# Patient Record
Sex: MALE | Race: WHITE | HISPANIC OR LATINO | Employment: FULL TIME | URBAN - METROPOLITAN AREA
[De-identification: names, ages, dates, MRNs, and addresses within clinical notes are randomized per-mention and may not be internally consistent; named-entity substitution may affect disease eponyms.]

---

## 2019-11-20 ENCOUNTER — OFFICE VISIT (OUTPATIENT)
Dept: URGENT CARE | Facility: PHYSICIAN GROUP | Age: 39
End: 2019-11-20

## 2019-11-20 VITALS
WEIGHT: 168.6 LBS | SYSTOLIC BLOOD PRESSURE: 102 MMHG | HEART RATE: 65 BPM | OXYGEN SATURATION: 96 % | TEMPERATURE: 97.8 F | HEIGHT: 62 IN | BODY MASS INDEX: 31.03 KG/M2 | RESPIRATION RATE: 16 BRPM | DIASTOLIC BLOOD PRESSURE: 70 MMHG

## 2019-11-20 DIAGNOSIS — M77.8 TENDONITIS OF ELBOW, RIGHT: ICD-10-CM

## 2019-11-20 DIAGNOSIS — J45.901 MILD ASTHMA WITH ACUTE EXACERBATION, UNSPECIFIED WHETHER PERSISTENT: ICD-10-CM

## 2019-11-20 PROCEDURE — 99203 OFFICE O/P NEW LOW 30 MIN: CPT | Performed by: PHYSICIAN ASSISTANT

## 2019-11-20 RX ORDER — MELOXICAM 7.5 MG/1
7.5 TABLET ORAL DAILY
Qty: 10 TAB | Refills: 0 | Status: SHIPPED | OUTPATIENT
Start: 2019-11-20 | End: 2019-11-30

## 2019-11-20 RX ORDER — ALBUTEROL SULFATE 90 UG/1
2 AEROSOL, METERED RESPIRATORY (INHALATION) EVERY 6 HOURS PRN
Qty: 8.5 G | Refills: 0 | Status: SHIPPED | OUTPATIENT
Start: 2019-11-20 | End: 2022-07-08

## 2019-11-20 NOTE — PROGRESS NOTES
"Subjective:      Kian Jackson is a 39 y.o. male who presents with Hand Pain (he states that right elbow hurts him, hes unable to lift anything, x2 weeks)            Pt is a 38 y/o male who presents to  with right elbow pain for the last 2 weeks. He denies specific injury- however reports lifting heavy objects along with pushing and turning his wrist all aggravate his right elbow- and gripping. He denies specific fall or trauma. He is right handed.   On exam he also with with slight wheeze he does mention that he has hx of asthma and is requesting an inhaler today as well. Denies any numbness or tingling.     He is with his wife today who also provides hx-  was used throughout the duration of the visit today.       Other   This is a new problem. Episode onset: 2 weeks ago. The problem occurs daily. The problem has been waxing and waning. Associated symptoms include coughing. Pertinent negatives include no chills, congestion, fever, joint swelling or rash. Exacerbated by: Lifting, twisting his wrist against resistance.  He has tried nothing for the symptoms.       Review of Systems   Constitutional: Negative for chills and fever.   HENT: Negative for congestion.    Eyes: Negative for discharge and redness.   Respiratory: Positive for cough and wheezing.    Musculoskeletal: Positive for joint pain. Negative for back pain, falls and joint swelling.   Skin: Negative for rash.   Neurological: Positive for focal weakness. Negative for tingling and sensory change.        Right  weakness.    All other systems reviewed and are negative.         Objective:     /70 (BP Location: Left arm, Patient Position: Sitting, BP Cuff Size: Adult)   Pulse 65   Temp 36.6 °C (97.8 °F) (Temporal)   Resp 16   Ht 1.575 m (5' 2\")   Wt 76.5 kg (168 lb 9.6 oz)   SpO2 96%   BMI 30.84 kg/m²    PMH:  has no past medical history on file.  MEDS: Reviewed .   ALLERGIES: No Known Allergies  SURGHX: No past surgical history on " file.  SOCHX:    FH: Family history was reviewed, no pertinent findings to report    Physical Exam  Vitals signs reviewed.   Constitutional:       General: He is not in acute distress.     Appearance: He is well-developed.   HENT:      Head: Normocephalic and atraumatic.      Right Ear: External ear normal.      Left Ear: External ear normal.      Mouth/Throat:      Pharynx: No oropharyngeal exudate.   Eyes:      Conjunctiva/sclera: Conjunctivae normal.      Pupils: Pupils are equal, round, and reactive to light.   Neck:      Musculoskeletal: Normal range of motion and neck supple.      Trachea: No tracheal deviation.   Cardiovascular:      Rate and Rhythm: Normal rate and regular rhythm.      Heart sounds: No murmur.   Pulmonary:      Effort: Pulmonary effort is normal. No respiratory distress.      Breath sounds: Wheezing present.   Musculoskeletal:      Right elbow: Tenderness found. Lateral epicondyle tenderness noted.      Comments: Slight tenderness to the right lat. Epicondyle with , and resisted pronation.   Resolved pain with counterforce pressure to the lateral elbow. Without bony tenderness. FROM of the elbow.  equal.    Skin:     General: Skin is warm.      Findings: No rash.   Neurological:      Mental Status: He is alert and oriented to person, place, and time.      Coordination: Coordination normal.   Psychiatric:         Behavior: Behavior normal.         Thought Content: Thought content normal.         Judgment: Judgment normal.                 Assessment/Plan:       1. Tendonitis of elbow, right  - meloxicam (MOBIC) 7.5 MG Tab; Take 1 Tab by mouth every day for 10 days.  Dispense: 10 Tab; Refill: 0    2. Mild asthma with acute exacerbation, unspecified whether persistent  - albuterol 108 (90 Base) MCG/ACT Aero Soln inhalation aerosol; Inhale 2 Puffs by mouth every 6 hours as needed for Shortness of Breath.  Dispense: 8.5 g; Refill: 0    Will write for the above- avoid other NSAIDs,    Discussed counterforce bracing as well.   Ice. F/U with PCP for further asthma care and recheck of tendonitis.   Patient given precautionary s/sx that mandate immediate follow up and evaluation in the ED. Advised of risks of not doing so.    DDX, Supportive care, and indications for immediate follow-up discussed with patient.    Instructed to return to clinic or nearest emergency department if we are not available for any change in condition, further concerns, or worsening of symptoms.    The patient demonstrated a good understanding and agreed with the treatment plan.  Please note that this dictation was created using voice recognition software. I have made every reasonable attempt to correct obvious errors, but I expect that there are errors of grammar and possibly content that I did not discover before finalizing the note.

## 2019-11-21 ASSESSMENT — ENCOUNTER SYMPTOMS
TINGLING: 0
FALLS: 0
COUGH: 1
FEVER: 0
JOINT SWELLING: 0
BACK PAIN: 0
SENSORY CHANGE: 0
FOCAL WEAKNESS: 1
EYE REDNESS: 0
EYE DISCHARGE: 0
CHILLS: 0
WHEEZING: 1

## 2020-06-23 ENCOUNTER — OFFICE VISIT (OUTPATIENT)
Dept: URGENT CARE | Facility: PHYSICIAN GROUP | Age: 40
End: 2020-06-23

## 2020-06-23 ENCOUNTER — HOSPITAL ENCOUNTER (OUTPATIENT)
Facility: MEDICAL CENTER | Age: 40
End: 2020-06-23
Attending: NURSE PRACTITIONER
Payer: OTHER GOVERNMENT

## 2020-06-23 VITALS
BODY MASS INDEX: 30.91 KG/M2 | OXYGEN SATURATION: 92 % | HEART RATE: 106 BPM | TEMPERATURE: 99.8 F | RESPIRATION RATE: 16 BRPM | HEIGHT: 62 IN | SYSTOLIC BLOOD PRESSURE: 106 MMHG | WEIGHT: 168 LBS | DIASTOLIC BLOOD PRESSURE: 68 MMHG

## 2020-06-23 DIAGNOSIS — Z20.822 SUSPECTED COVID-19 VIRUS INFECTION: ICD-10-CM

## 2020-06-23 LAB — COVID ORDER STATUS COVID19: NORMAL

## 2020-06-23 PROCEDURE — 99214 OFFICE O/P EST MOD 30 MIN: CPT | Performed by: NURSE PRACTITIONER

## 2020-06-23 PROCEDURE — U0003 INFECTIOUS AGENT DETECTION BY NUCLEIC ACID (DNA OR RNA); SEVERE ACUTE RESPIRATORY SYNDROME CORONAVIRUS 2 (SARS-COV-2) (CORONAVIRUS DISEASE [COVID-19]), AMPLIFIED PROBE TECHNIQUE, MAKING USE OF HIGH THROUGHPUT TECHNOLOGIES AS DESCRIBED BY CMS-2020-01-R: HCPCS

## 2020-06-23 RX ORDER — DEXTROMETHORPHAN HYDROBROMIDE AND PROMETHAZINE HYDROCHLORIDE 15; 6.25 MG/5ML; MG/5ML
5 SYRUP ORAL EVERY 4 HOURS PRN
Qty: 100 ML | Refills: 0 | Status: SHIPPED | OUTPATIENT
Start: 2020-06-23 | End: 2022-07-08

## 2020-06-23 RX ORDER — BENZONATATE 100 MG/1
100 CAPSULE ORAL 3 TIMES DAILY PRN
Qty: 60 CAP | Refills: 0 | Status: SHIPPED | OUTPATIENT
Start: 2020-06-23 | End: 2022-07-08

## 2020-06-23 ASSESSMENT — ENCOUNTER SYMPTOMS
VOMITING: 0
WHEEZING: 1
ABDOMINAL PAIN: 0
NAUSEA: 0
HEADACHES: 0
FEVER: 1
DIARRHEA: 0
DIZZINESS: 0
CHILLS: 1
SPUTUM PRODUCTION: 0
COUGH: 1
SORE THROAT: 0
SHORTNESS OF BREATH: 1

## 2020-06-23 NOTE — PROGRESS NOTES
Subjective:   Kian Jackson  is a 39 y.o. male who presents for Cough (Cough, fever ongoing 4 days)        Cough   This is a new problem. Episode onset: 5 days ago. The problem has been gradually worsening. The cough is non-productive. Associated symptoms include chills, a fever, shortness of breath and wheezing. Pertinent negatives include no headaches, rash or sore throat. Associated symptoms comments: 39-year-old male patient reports urgent care for new problem that started proximately 5 days ago.  Patient states he is developed a dry cough, shortness of breath and T-max fever 100.8.  Is currently not taking anything over-the-counter for symptoms but does have a history of asthma and has been using his albuterol inhaler daily.  Did have some nausea and vomiting 2 days ago but that has since disappeared.  Patient denies any nausea, vomiting today denies any rash, headache, diarrhea.. Exacerbated by: activity. He has tried a beta-agonist inhaler for the symptoms. The treatment provided mild relief. His past medical history is significant for asthma.     Review of Systems   Constitutional: Positive for chills, fever and malaise/fatigue.   HENT: Negative for sore throat.    Respiratory: Positive for cough, shortness of breath and wheezing. Negative for sputum production.    Gastrointestinal: Negative for abdominal pain, diarrhea, nausea and vomiting.   Skin: Negative for itching and rash.   Neurological: Negative for dizziness and headaches.     History reviewed. No pertinent past medical history. History reviewed. No pertinent surgical history.   Social History     Socioeconomic History   • Marital status:      Spouse name: Not on file   • Number of children: Not on file   • Years of education: Not on file   • Highest education level: Not on file   Occupational History   • Not on file   Social Needs   • Financial resource strain: Not on file   • Food insecurity     Worry: Not on file     Inability: Not on file  "  • Transportation needs     Medical: Not on file     Non-medical: Not on file   Tobacco Use   • Smoking status: Never Smoker   • Smokeless tobacco: Never Used   Substance and Sexual Activity   • Alcohol use: Not on file   • Drug use: Not on file   • Sexual activity: Not on file   Lifestyle   • Physical activity     Days per week: Not on file     Minutes per session: Not on file   • Stress: Not on file   Relationships   • Social connections     Talks on phone: Not on file     Gets together: Not on file     Attends [employer] service: Not on file     Active member of club or organization: Not on file     Attends meetings of clubs or organizations: Not on file     Relationship status: Not on file   • Intimate partner violence     Fear of current or ex partner: Not on file     Emotionally abused: Not on file     Physically abused: Not on file     Forced sexual activity: Not on file   Other Topics Concern   • Not on file   Social History Narrative   • Not on file    Patient has no known allergies.       Objective:   /68   Pulse (!) 106   Temp 37.7 °C (99.8 °F) (Temporal)   Resp 16   Ht 1.575 m (5' 2\")   Wt 76.2 kg (168 lb)   SpO2 92%   BMI 30.73 kg/m²   Physical Exam  Vitals signs reviewed.   Constitutional:       Appearance: He is ill-appearing.   HENT:      Right Ear: Tympanic membrane, ear canal and external ear normal.      Left Ear: Tympanic membrane, ear canal and external ear normal.      Nose: Nose normal.      Mouth/Throat:      Mouth: Mucous membranes are moist.   Cardiovascular:      Rate and Rhythm: Normal rate and regular rhythm.      Heart sounds: Normal heart sounds.   Pulmonary:      Effort: Pulmonary effort is normal.      Breath sounds: Normal breath sounds.   Skin:     General: Skin is warm.   Neurological:      Mental Status: He is alert and oriented to person, place, and time.   Psychiatric:         Mood and Affect: Mood normal.         Behavior: Behavior normal.         Thought Content: " Thought content normal.         Judgment: Judgment normal.           Assessment/Plan:   1. Suspected COVID-19 virus infection  - COVID/SARS COV-2 PCR; Future  - benzonatate (TESSALON) 100 MG Cap; Take 1 Cap by mouth 3 times a day as needed for Cough.  Dispense: 60 Cap; Refill: 0  - promethazine-dextromethorphan (PROMETHAZINE-DM) 6.25-15 MG/5ML syrup; Take 5 mL by mouth every four hours as needed for Cough.  Dispense: 100 mL; Refill: 0    Discussed physical examination findings as well as patient complaints could be due to viral etiology including but not limited to COVID-19.  Will perform eye cover test and call with results.  Advised patient to stay at home for 14 days or until he hears from me with negative results.  Discussed symptomatic care including increase fluids using electrolyte enriched beverages, over-the-counter Tylenol, continuation of albuterol inhaler as needed as well as Tessalon Perles and promethazine at night for cough.  Did discuss sedating effects of promethazine    Supportive care, differential diagnoses, and indications for immediate follow-up discussed with patient.    Pathogenesis of diagnosis discussed including typical length and natural progression. Patient expresses understanding and agrees to plan.    Appropriate PPE worn at all times by provider. Patient had face mask on for entirety of visit other than during oropharyngeal examination  Work note provided       Please note that this dictation was created using voice recognition software. I have made every reasonable attempt to correct obvious errors, but I expect that there are errors of grammar and possibly content that I did not discover before finalizing the note.

## 2020-06-23 NOTE — LETTER
June 23, 2020         Patient: Kian Jackson   YOB: 1980   Date of Visit: 6/23/2020           To Whom it May Concern:    Kian Jackson was seen in my clinic on 6/23/2020. We are following CDC recommendations and patient is not to return to work for 14 days and self isolate or is symptom free for 72 hours without the use of over the counter medications.   If you have any questions or concerns, please don't hesitate to call.        Sincerely,           PORFIRIO Rubalcava.  Electronically Signed

## 2020-06-24 ENCOUNTER — TELEPHONE (OUTPATIENT)
Dept: URGENT CARE | Facility: PHYSICIAN GROUP | Age: 40
End: 2020-06-24

## 2020-06-24 LAB
SARS-COV-2 RNA RESP QL NAA+PROBE: DETECTED
SPECIMEN SOURCE: ABNORMAL

## 2020-06-24 NOTE — TELEPHONE ENCOUNTER
Called and spoke with patient's wife due to him being [place]ian-speaking only.  Discussed that he was positive for the COVID 19 virus.  Advised to stay home for 14 days.  Wife asked if she should be tested and since she has been exposed she can come in to either the urgent care or go to the county and get tested due to positive exposure.  Gave strict ER precautions for worsening symptoms.       Pathogenesis of diagnosis discussed including typical length and natural progression. Patient expresses understanding and agrees to plan.         Please note that this dictation was created using voice recognition software. I have made every reasonable attempt to correct obvious errors, but I expect that there are errors of grammar and possibly content that I did not discover before finalizing the note.

## 2022-07-08 ENCOUNTER — OFFICE VISIT (OUTPATIENT)
Dept: URGENT CARE | Facility: PHYSICIAN GROUP | Age: 42
End: 2022-07-08

## 2022-07-08 VITALS
TEMPERATURE: 97.1 F | HEART RATE: 73 BPM | HEIGHT: 68 IN | RESPIRATION RATE: 20 BRPM | DIASTOLIC BLOOD PRESSURE: 64 MMHG | WEIGHT: 170 LBS | OXYGEN SATURATION: 98 % | BODY MASS INDEX: 25.76 KG/M2 | SYSTOLIC BLOOD PRESSURE: 118 MMHG

## 2022-07-08 DIAGNOSIS — S51.811A LACERATION OF RIGHT FOREARM, INITIAL ENCOUNTER: ICD-10-CM

## 2022-07-08 PROCEDURE — 90714 TD VACC NO PRESV 7 YRS+ IM: CPT | Performed by: NURSE PRACTITIONER

## 2022-07-08 PROCEDURE — 90471 IMMUNIZATION ADMIN: CPT | Performed by: NURSE PRACTITIONER

## 2022-07-08 PROCEDURE — 12002 RPR S/N/AX/GEN/TRNK2.6-7.5CM: CPT | Performed by: NURSE PRACTITIONER

## 2022-07-08 NOTE — PROGRESS NOTES
Chief Complaint   Patient presents with   • Laceration     Right forearm- today        HISTORY OF PRESENT ILLNESS: Patient is a pleasant 41 y.o. male who presents to urgent care today with complaints of a laceration. Notes he accidentally lacerated his right forearm this morning with the edge of a piece of metal, at a friends house. He washed the wound out immediately, placed wound  on after the incident. He is right hand dominate. Denies numbness, tingling, weakness. He cannot recall his last tetanus booster.     There are no problems to display for this patient.      Allergies:Patient has no known allergies.    No current [facility]-ordered outpatient medications on file.     No current [facility]-ordered facility-administered medications on file.       History reviewed. No pertinent past medical history.    Social History     Tobacco Use   • Smoking status: Never Smoker   • Smokeless tobacco: Never Used   Vaping Use   • Vaping Use: Never used   Substance Use Topics   • Alcohol use: Never   • Drug use: Never       No family status information on file.   History reviewed. No pertinent family history.    ROS:  Review of Systems   Constitutional: Negative for fever, chills, weight loss, malaise, and fatigue.   HENT: Negative for ear pain, nosebleeds, congestion, sore throat and neck pain.    Eyes: Negative for vision changes.   Neuro: Negative for headache, sensory changes, weakness, seizure, LOC.   Cardiovascular: Negative for chest pain, palpitations, orthopnea and leg swelling.   Respiratory: Negative for cough, sputum production, shortness of breath and wheezing.   Gastrointestinal: Negative for abdominal pain, nausea, vomiting or diarrhea.   Genitourinary: Negative for dysuria, urgency and frequency.  Musculoskeletal: Negative for falls, neck pain, back pain, joint pain, myalgias.   Skin: Positive for laceration. Negative for rash, diaphoresis.     Exam:  /64 (BP Location: Right arm, Patient Position: Sitting,  "BP Cuff Size: Adult)   Pulse 73   Temp 36.2 °C (97.1 °F) (Temporal)   Resp 20   Ht 1.727 m (5' 8\")   Wt 77.1 kg (170 lb)   SpO2 98%   General: well-nourished, well-developed male in NAD  Head: normocephalic, atraumatic  Eyes: PERRLA, no conjunctival injection, acuity grossly intact, lids normal.  Ears: normal shape and symmetry, no tenderness, no discharge. External canals are without any significant edema or erythema. Tympanic membranes are without any inflammation, no effusion. Gross auditory acuity is intact.  Nose: symmetrical without tenderness, no discharge.  Mouth/Throat: reasonable hygiene, no erythema, exudates or tonsillar enlargement.  Neck: no masses, range of motion within normal limits, no tracheal deviation. No obvious thyroid enlargement.   Lymph: no cervical adenopathy. No supraclavicular adenopathy.   Neuro: alert and oriented. Cranial nerves 1-12 grossly intact. No sensory deficit.   Cardiovascular: regular rate and rhythm. No edema.  Pulmonary: no distress. Chest is symmetrical with respiration, no wheezes, crackles, or rhonchi.   Musculoskeletal: no clubbing, appropriate muscle tone, gait is stable.  Skin: warm, dry, no clubbing, no cyanosis, no rashes. There is a 3cm full thickness linear laceration to right forearm, wound without foreign bodies, bleeding controlled.   Psych: appropriate mood, affect, judgement.       Laceration Repair Procedure Note      Indication: Forearm laceration     Procedure: Risks including bleeding, nerve damage, infection, and poor cosmetic outcome discussed at length. Benefits and alternatives discussed. The patient was placed in the appropriate position and anesthesia around the laceration was obtained by infiltration using 2% Lidocaine without epinephrine. The area was then cleansed with betadine and draped in a sterile fashion and irrigated with normal saline. The laceration was closed with #6 5-0 Nylon using interrupted sutures with good wound " approximation. There were no additional lacerations requiring repair. The wound area was then dressed with bacitracin.       Total repaired wound length: 3cm     Other Items: Suture count: 6     The patient tolerated the procedure well.     Complications: None        Assessment/Plan:  1. Laceration of right forearm, initial encounter  TD Preservative Free =>10yo IM       Wound repaired, tolerated well. TD updated in clinic (no TDAP available). Wound care discussed.   Supportive care, differential diagnoses, and indications for immediate follow-up discussed with patient.   Pathogenesis of diagnosis discussed including typical length and natural progression.   Instructed to return to clinic or nearest emergency department for any change in condition, further concerns, or worsening of symptoms.  Patient states understanding of the plan of care and discharge instructions.          Please note that this dictation was created using voice recognition software. I have made every reasonable attempt to correct obvious errors, but I expect that there are errors of grammar and possibly content that I did not discover before finalizing the note. Patient seen and evaluated by both myself and APRN student Olimpia Trevino.         PORFIRIO Hernandez.

## 2025-03-29 ENCOUNTER — HOSPITAL ENCOUNTER (EMERGENCY)
Facility: MEDICAL CENTER | Age: 45
End: 2025-03-30
Attending: STUDENT IN AN ORGANIZED HEALTH CARE EDUCATION/TRAINING PROGRAM

## 2025-03-29 DIAGNOSIS — T78.2XXA ANAPHYLAXIS, INITIAL ENCOUNTER: Primary | ICD-10-CM

## 2025-03-29 PROCEDURE — 99284 EMERGENCY DEPT VISIT MOD MDM: CPT

## 2025-03-30 VITALS
BODY MASS INDEX: 32.5 KG/M2 | WEIGHT: 176.59 LBS | HEART RATE: 94 BPM | RESPIRATION RATE: 16 BRPM | OXYGEN SATURATION: 93 % | TEMPERATURE: 98.6 F | HEIGHT: 62 IN | SYSTOLIC BLOOD PRESSURE: 126 MMHG | DIASTOLIC BLOOD PRESSURE: 76 MMHG

## 2025-03-30 PROCEDURE — 96374 THER/PROPH/DIAG INJ IV PUSH: CPT

## 2025-03-30 PROCEDURE — 700111 HCHG RX REV CODE 636 W/ 250 OVERRIDE (IP): Mod: JZ | Performed by: STUDENT IN AN ORGANIZED HEALTH CARE EDUCATION/TRAINING PROGRAM

## 2025-03-30 PROCEDURE — 700102 HCHG RX REV CODE 250 W/ 637 OVERRIDE(OP): Performed by: STUDENT IN AN ORGANIZED HEALTH CARE EDUCATION/TRAINING PROGRAM

## 2025-03-30 PROCEDURE — 700101 HCHG RX REV CODE 250: Performed by: STUDENT IN AN ORGANIZED HEALTH CARE EDUCATION/TRAINING PROGRAM

## 2025-03-30 PROCEDURE — A9270 NON-COVERED ITEM OR SERVICE: HCPCS | Performed by: STUDENT IN AN ORGANIZED HEALTH CARE EDUCATION/TRAINING PROGRAM

## 2025-03-30 PROCEDURE — 96372 THER/PROPH/DIAG INJ SC/IM: CPT

## 2025-03-30 PROCEDURE — 94640 AIRWAY INHALATION TREATMENT: CPT

## 2025-03-30 RX ORDER — EPINEPHRINE 1 MG/ML(1)
0.3 AMPUL (ML) INJECTION ONCE
Status: COMPLETED | OUTPATIENT
Start: 2025-03-30 | End: 2025-03-30

## 2025-03-30 RX ORDER — CETIRIZINE HYDROCHLORIDE 10 MG/1
10 TABLET ORAL ONCE
Status: COMPLETED | OUTPATIENT
Start: 2025-03-30 | End: 2025-03-30

## 2025-03-30 RX ORDER — CETIRIZINE HYDROCHLORIDE 5 MG/1
5 TABLET ORAL DAILY
Qty: 30 TABLET | Refills: 0 | Status: SHIPPED | OUTPATIENT
Start: 2025-03-30

## 2025-03-30 RX ORDER — ALBUTEROL SULFATE 5 MG/ML
2.5 SOLUTION RESPIRATORY (INHALATION) ONCE
Status: COMPLETED | OUTPATIENT
Start: 2025-03-30 | End: 2025-03-30

## 2025-03-30 RX ORDER — METHYLPREDNISOLONE SODIUM SUCCINATE 125 MG/2ML
125 INJECTION, POWDER, LYOPHILIZED, FOR SOLUTION INTRAMUSCULAR; INTRAVENOUS ONCE
Status: COMPLETED | OUTPATIENT
Start: 2025-03-30 | End: 2025-03-30

## 2025-03-30 RX ADMIN — ALBUTEROL SULFATE 2.5 MG: 2.5 SOLUTION RESPIRATORY (INHALATION) at 00:16

## 2025-03-30 RX ADMIN — METHYLPREDNISOLONE SODIUM SUCCINATE 125 MG: 125 INJECTION, POWDER, FOR SOLUTION INTRAMUSCULAR; INTRAVENOUS at 00:14

## 2025-03-30 RX ADMIN — EPINEPHRINE 0.3 MG: 1 INJECTION INTRAMUSCULAR; INTRAVENOUS; SUBCUTANEOUS at 00:14

## 2025-03-30 RX ADMIN — CETIRIZINE HYDROCHLORIDE 10 MG: 10 TABLET, FILM COATED ORAL at 00:12

## 2025-03-30 NOTE — DISCHARGE INSTRUCTIONS
You are seen and evaluated the emergency department for your allergic reaction.  It was concerning for potential anaphylactic reaction, you were treated here with multiple medications that had improvement in symptoms.  You will be prescribed an EpiPen for home.  I will also prescribe you some antihistamines, you can take Benadryl as needed for itching, rash 25 mg every 8 hours.  I have also placed a referral for you to establish with a primary care doctor or you can follow-up with the above clinics.

## 2025-03-30 NOTE — ED TRIAGE NOTES
"Chief Complaint   Patient presents with    Allergic Reaction     Pt reports he was eating few hours ago and started to have itchiness and swollen face and all over his body, having a hard time to swallow, pt took 6 tablets of allergy pills but did not help. Denies shortness of breath.Pt have allergy to seafood's but unsure if the food he ate was cross contaminated with seafood.        /88   Pulse (!) 107   Temp 37 °C (98.6 °F) (Temporal)   Resp 18   Ht 1.575 m (5' 2\")   Wt 80.1 kg (176 lb 9.4 oz)   SpO2 94%   BMI 32.30 kg/m²     Ambulatory:  Yes  Alert and Oriented: x 4  Oxygen Treatment: No    Pt came in to triage for the above complaints accompanied by family. Obtained .   Pt is speaking in full sentences, follows commands and responds appropriately to questions.   Respirations are even and unlabored.  Pt placed in lobby. Pt educated on triage process.   Pt encouraged to inform staff for any changes in condition or if needs help while waiting to be room in.    "

## 2025-03-30 NOTE — ED PROVIDER NOTES
ED Provider Note    CHIEF COMPLAINT  Chief Complaint   Patient presents with    Allergic Reaction     Pt reports he was eating few hours ago and started to have itchiness and swollen face and all over his body, having a hard time to swallow, pt took 6 tablets of allergy pills but did not help. Denies shortness of breath.Pt have allergy to seafood's but unsure if the food he ate was cross contaminated with seafood.        /EXTERNAL RECORDS REVIEWED  Outpatient Notes Patient last seen at urgent care on 7/8/2022 for a laceration.     HPI/RO for a laceration.S  LIMITATION TO HISTORY   Select: Language Citizen of [place], for which a friend interpreted,  Used   OUTSIDE HISTORIAN(S):  None     Kian Jackson is a 44 y.o. male who presents for an allergic reaction onset one and a half hours ago. The patient reports that after having dinner he began to have an allergic reaction and felt his face and eyes swell. He notes that this has happened in the past, the last being about- four years ago and when he stopped consuming milk his reactions stopped. He confirms facial swelling and eyelid swelling. He denies any nausea, vomiting, difficulty breathing, or diarrhea. He notes he took six allergy pills for alleviation of his symptoms. The patient does not see a PCP or have an Epipen.     PAST MEDICAL HISTORY   None     SURGICAL HISTORY  patient denies any surgical history    FAMILY HISTORY  History reviewed. No pertinent family history.    SOCIAL HISTORY  Social History     Tobacco Use    Smoking status: Never    Smokeless tobacco: Never   Vaping Use    Vaping status: Never Used   Substance and Sexual Activity    Alcohol use: Never    Drug use: Never    Sexual activity: Not on file       CURRENT MEDICATIONS  Home Medications       Reviewed by Glo Whitney R.N. (Registered Nurse) on 03/29/25 at 2314  Med List Status: Not Addressed     Medication Last Dose Status        Patient Darrell Taking any Medications                      "      ALLERGIES  Allergies   Allergen Reactions    Shellfish-Derived Products Itching and Swelling     Swelling, itchiness       PHYSICAL EXAM  VITAL SIGNS: /88   Pulse (!) 107   Temp 37 °C (98.6 °F) (Temporal)   Resp 18   Ht 1.575 m (5' 2\")   Wt 80.1 kg (176 lb 9.4 oz)   SpO2 94%   BMI 32.30 kg/m²    Constitutional: Awake and alert  HENT: Normal inspection, Posterior oral pharynx has mild erythema, no tongue or lip swelling.   Eyes: Normal inspection, periorbital edema with injected conjunctiva.   Neck: Grossly normal range of motion.  Cardiovascular: Normal heart rate, Normal rhythm.  Symmetric peripheral pulses.   Thorax & Lungs: No respiratory distress, No rales, No rhonchi, No chest tenderness, diffuses wheezes  Abdomen: Bowel sounds normal, soft, non-distended, no mass, no abdominal tenderness.  Skin: No obvious rash.  Back: No tenderness, No CVA tenderness.   Extremities: No clubbing, cyanosis, edema, no Homans or cords.  Neurologic: Grossly normal   Psychiatric: Normal for situation      COURSE & MEDICAL DECISION MAKING    ASSESSMENT, COURSE AND PLAN  Care Narrative:     12:02 AM - Patient seen and examined at bedside.  Patient presents for allergic reaction, states ate something that may have been cross contaminated with shellfish which patient has a known allergy to, he states that despite taking approximately 6 pills of Benadryl at home he still having some worsening facial swelling, denies any nausea or vomiting.  On exam patient was mildly tachycardic, otherwise hemodynamically stable.  He did have some wheezing noted on exam as well as some erythema on the posterior oropharynx.  Will treat the patient for my concern of underlying anaphylaxis with medications listed. discussed plan of care, including medication. Patient agrees to the plan of care. The patient will be  medicated with Anaphylaxis dose 0.3 mg IM, Solumedrol 125 mg injection, Proventil 2.5 mg/0.5 ml nebulizer solution in 2.5 " mg. And Zyrtec 10 mg tablet.     1:19 AM - Patient was reevaluated at bedside. Patient is hemodynamically stable with no wheezing, tachycardia, and no more posterior oral erythema.    2:21 AM - I reevaluated the patient at bedside. He notes all his symptoms have improved and he is hemodynamically stable at this time. I discussed the patient's diagnostic study results which show . I discussed plan for discharge and follow up as outlined below. The patient is stable for discharge at this time and will return for any new or worsening symptoms. Patient verbalizes understanding and support with my plan for discharge.     ADDITIONAL PROBLEMS MANAGED  Anaphylaxis    DISPOSITION AND DISCUSSIONS    I have discussed management of the patient with the following physicians and JENNY's:  None     Discussion of management with other [facility] or appropriate source(s): None     Escalation of care considered, and ultimately not performed:acute inpatient care management, however at this time, the patient is most appropriate for outpatient management    Barriers to care at this time, including but not limited to: Patient does not have established PCP.     Decision tools and prescription drugs considered including, but not limited to:  EpiPen, antihistamines .    The patient will return for new or worsening symptoms and is stable at the time of discharge.    DISPOSITION:  Patient will be discharged home in stable condition.    FOLLOW UP:  [facility] ([facility]) - Primary Care and Family Medicine  [address] [facility] [id number] [phone]  Schedule an appointment as soon as possible for a visit       [facility] - Primary Care  [address] [facility] [address]  [phone]  Schedule an appointment as soon as possible for a visit         OUTPATIENT MEDICATIONS:  New Prescriptions    CETIRIZINE (ZYRTEC) 5 MG TABLET    Take 1 Tablet by mouth every day.    EPINEPHRINE 0.3 MG/0.3ML SOLUTION PREFILLED SYRINGE     Inject the contents of the epipen into the thigh, hold for 3 seconds and release from thigh as needed for anaphylaxis.       FINAL DIAGNOSIS  1. Anaphylaxis, initial encounter Acute        IFabiola (Scribe), am scribing for, and in the presence of, Joshua Neely M.D..    Electronically signed by: Fabiola Peralta (Scribe), 3/30/2025    IJoshua M.D. personally performed the services described in this documentation, as scribed by Fabiola Peralta in my presence, and it is both accurate and complete.       Electronically signed by: Joshua Neely M.D., 3/29/2025 11:48 PM